# Patient Record
Sex: MALE | Race: WHITE | Employment: FULL TIME | URBAN - METROPOLITAN AREA
[De-identification: names, ages, dates, MRNs, and addresses within clinical notes are randomized per-mention and may not be internally consistent; named-entity substitution may affect disease eponyms.]

---

## 2017-02-20 RX ORDER — LISINOPRIL 10 MG/1
10 TABLET ORAL DAILY
Qty: 30 TABLET | Refills: 0 | Status: SHIPPED | OUTPATIENT
Start: 2017-02-20 | End: 2017-03-18 | Stop reason: SDUPTHER

## 2017-03-30 ENCOUNTER — OFFICE VISIT (OUTPATIENT)
Dept: FAMILY MEDICINE CLINIC | Age: 57
End: 2017-03-30

## 2017-03-30 DIAGNOSIS — D12.6 SERRATED ADENOMA OF COLON: ICD-10-CM

## 2017-03-30 DIAGNOSIS — L40.9 PSORIASIS: ICD-10-CM

## 2017-03-30 DIAGNOSIS — I10 ESSENTIAL HYPERTENSION: Primary | ICD-10-CM

## 2017-03-30 DIAGNOSIS — Z00.00 ROUTINE HEALTH MAINTENANCE: ICD-10-CM

## 2017-03-30 PROCEDURE — 99214 OFFICE O/P EST MOD 30 MIN: CPT | Performed by: NURSE PRACTITIONER

## 2017-03-31 VITALS
BODY MASS INDEX: 24.18 KG/M2 | WEIGHT: 159 LBS | SYSTOLIC BLOOD PRESSURE: 130 MMHG | TEMPERATURE: 98.4 F | HEART RATE: 80 BPM | DIASTOLIC BLOOD PRESSURE: 80 MMHG | RESPIRATION RATE: 16 BRPM

## 2017-03-31 ASSESSMENT — ENCOUNTER SYMPTOMS
SHORTNESS OF BREATH: 0
BLURRED VISION: 0
ORTHOPNEA: 0

## 2017-04-02 PROBLEM — L40.9 PSORIASIS: Status: ACTIVE | Noted: 2017-04-02

## 2017-04-02 RX ORDER — HALOBETASOL PROPIONATE 0.05 %
OINTMENT (GRAM) TOPICAL
Qty: 50 G | Refills: 2 | Status: SHIPPED | OUTPATIENT
Start: 2017-04-02 | End: 2017-10-20 | Stop reason: SDUPTHER

## 2017-04-02 ASSESSMENT — ENCOUNTER SYMPTOMS
NAUSEA: 0
RHINORRHEA: 0
VOMITING: 0
CHEST TIGHTNESS: 0
COUGH: 0
WHEEZING: 0
BACK PAIN: 0
CONSTIPATION: 0
SORE THROAT: 0
EYE PAIN: 0
ABDOMINAL PAIN: 0
ABDOMINAL DISTENTION: 0
PHOTOPHOBIA: 0
DIARRHEA: 0
BLOOD IN STOOL: 0

## 2017-06-12 RX ORDER — LISINOPRIL 10 MG/1
10 TABLET ORAL DAILY
Qty: 30 TABLET | Refills: 3 | Status: SHIPPED | OUTPATIENT
Start: 2017-06-12 | End: 2017-10-16 | Stop reason: SDUPTHER

## 2017-10-16 RX ORDER — LISINOPRIL 10 MG/1
10 TABLET ORAL DAILY
Qty: 30 TABLET | Refills: 3 | Status: SHIPPED | OUTPATIENT
Start: 2017-10-16 | End: 2017-11-20 | Stop reason: SDUPTHER

## 2017-10-16 NOTE — TELEPHONE ENCOUNTER
LOV 3/30/17  LRF 6/12/17    Health Maintenance   Topic Date Due    Hepatitis C screen  1960    HIV screen  11/10/1976    DTaP/Tdap/Td vaccine (1 - Tdap) 11/10/1980    Colon cancer screen colonoscopy  02/15/2017    Flu vaccine (1) 09/01/2017    Lipid screen  05/23/2019             (applicable per patient's age: Cancer Screenings, Depression Screening, Fall Risk Screening, Immunizations)    LDL Calculated (mg/dL)   Date Value   05/23/2014 131     AST (U/L)   Date Value   05/23/2014 17     ALT (U/L)   Date Value   05/23/2014 17     BUN (mg/dL)   Date Value   05/23/2014 13      (goal A1C is < 7)   (goal LDL is <100) need 30-50% reduction from baseline     BP Readings from Last 3 Encounters:   03/31/17 130/80   11/19/15 140/82   09/30/13 122/88    (goal /80)      All Future Testing planned in [facility]:  Lab Frequency Next Occurrence   Lipid Panel Once 04/10/2017   Comprehensive Metabolic Panel Once 64/03/7452   CBC Once 04/10/2017       Next Visit Date:  No future appointments.          Patient Active Problem List:     HTN (hypertension)     Serrated adenoma of colon     Psoriasis

## 2017-10-20 RX ORDER — HALOBETASOL PROPIONATE 0.05 %
OINTMENT (GRAM) TOPICAL
Qty: 50 G | Refills: 1 | Status: SHIPPED | OUTPATIENT
Start: 2017-10-20 | End: 2018-03-29 | Stop reason: SDUPTHER

## 2017-11-20 RX ORDER — LISINOPRIL 10 MG/1
10 TABLET ORAL DAILY
Qty: 90 TABLET | Refills: 0 | Status: SHIPPED | OUTPATIENT
Start: 2017-11-20 | End: 2018-02-28 | Stop reason: SDUPTHER

## 2017-12-27 ENCOUNTER — HOSPITAL ENCOUNTER (OUTPATIENT)
Age: 57
Setting detail: SPECIMEN
Discharge: HOME | End: 2017-12-27
Payer: COMMERCIAL

## 2017-12-27 DIAGNOSIS — Z00.00 ROUTINE HEALTH MAINTENANCE: ICD-10-CM

## 2017-12-27 DIAGNOSIS — I10 ESSENTIAL HYPERTENSION: ICD-10-CM

## 2017-12-27 LAB
ALBUMIN SERPL-MCNC: 4.3 G/DL (ref 3.5–5.2)
ALBUMIN/GLOBULIN RATIO: 1.7 (ref 1–2.5)
ALP BLD-CCNC: 89 U/L (ref 40–129)
ALT SERPL-CCNC: 20 U/L (ref 5–41)
ANION GAP SERPL CALCULATED.3IONS-SCNC: 12 MMOL/L (ref 9–17)
AST SERPL-CCNC: 18 U/L
BILIRUB SERPL-MCNC: 0.73 MG/DL (ref 0.3–1.2)
BUN BLDV-MCNC: 16 MG/DL (ref 6–20)
BUN/CREAT BLD: ABNORMAL (ref 9–20)
CALCIUM SERPL-MCNC: 9.1 MG/DL (ref 8.6–10.4)
CHLORIDE BLD-SCNC: 97 MMOL/L (ref 98–107)
CHOLESTEROL/HDL RATIO: 2.1
CHOLESTEROL: 259 MG/DL
CO2: 28 MMOL/L (ref 20–31)
CREAT SERPL-MCNC: 0.8 MG/DL (ref 0.7–1.2)
GFR AFRICAN AMERICAN: >60 ML/MIN
GFR NON-AFRICAN AMERICAN: >60 ML/MIN
GFR SERPL CREATININE-BSD FRML MDRD: ABNORMAL ML/MIN/{1.73_M2}
GFR SERPL CREATININE-BSD FRML MDRD: ABNORMAL ML/MIN/{1.73_M2}
GLUCOSE BLD-MCNC: 95 MG/DL (ref 70–99)
HCT VFR BLD CALC: 46.3 % (ref 40.7–50.3)
HDLC SERPL-MCNC: 123 MG/DL
HEMOGLOBIN: 15.1 G/DL (ref 13–17)
LDL CHOLESTEROL: 117 MG/DL (ref 0–130)
MCH RBC QN AUTO: 30.4 PG (ref 25.2–33.5)
MCHC RBC AUTO-ENTMCNC: 32.6 G/DL (ref 28.4–34.8)
MCV RBC AUTO: 93.3 FL (ref 82.6–102.9)
PDW BLD-RTO: 12.5 % (ref 11.8–14.4)
PLATELET # BLD: 329 K/UL (ref 138–453)
PMV BLD AUTO: 10.4 FL (ref 8.1–13.5)
POTASSIUM SERPL-SCNC: 4.5 MMOL/L (ref 3.7–5.3)
RBC # BLD: 4.96 M/UL (ref 4.21–5.77)
SODIUM BLD-SCNC: 137 MMOL/L (ref 135–144)
TOTAL PROTEIN: 6.9 G/DL (ref 6.4–8.3)
TRIGL SERPL-MCNC: 95 MG/DL
VLDLC SERPL CALC-MCNC: ABNORMAL MG/DL (ref 1–30)
WBC # BLD: 4.5 K/UL (ref 3.5–11.3)

## 2018-02-13 ENCOUNTER — OFFICE VISIT (OUTPATIENT)
Dept: DERMATOLOGY | Age: 58
End: 2018-02-13
Payer: COMMERCIAL

## 2018-02-13 VITALS
OXYGEN SATURATION: 96 % | DIASTOLIC BLOOD PRESSURE: 84 MMHG | HEIGHT: 68 IN | WEIGHT: 166 LBS | SYSTOLIC BLOOD PRESSURE: 146 MMHG | BODY MASS INDEX: 25.16 KG/M2 | HEART RATE: 82 BPM

## 2018-02-13 DIAGNOSIS — L57.0 ACTINIC KERATOSIS: Primary | ICD-10-CM

## 2018-02-13 PROCEDURE — 99203 OFFICE O/P NEW LOW 30 MIN: CPT | Performed by: DERMATOLOGY

## 2018-02-13 RX ORDER — FLUOROURACIL 50 MG/G
CREAM TOPICAL
Qty: 80 G | Refills: 1 | Status: SHIPPED | OUTPATIENT
Start: 2018-02-13 | End: 2018-08-01 | Stop reason: ALTCHOICE

## 2018-02-13 NOTE — PROGRESS NOTES
thin layer of plain white petrolatum (Vaseline) or hydrocortisone 1% ointment (over the counter). You can stop using the petrolatum/hydrocortisone once the areas have healed. It takes 2-4 weeks for healthy new skin to replace the sun-damaged skin. The new skin can be slightly pink at first, but this usually resolves within a few weeks. To help with the healing process, you can use hydrocortisone 1% ointment twice a day. If you feel that you need something stronger, you can call the office for prescription strength. When should I call the office? If you notice any signs of infection such as excessive swelling, draining or oozing, or increased pain please contact the office. If you have any concerns about your treatment, or if you are experiencing difficulty with your recovery, please contact the office. Photo surveillance performed: No    Follow-up: follow up in 4 months    This note was created with the assistance of a speech-recognition program.  Although the intention is to generate a document that actually reflects the content of the visit, no guarantees can be provided that every mistake has been identified and corrected by editing.     Electronically signed by Hanna Uribe MD on 2/13/18 at 3:59 PM

## 2018-02-14 RX ORDER — FLUOROURACIL 50 MG/G
CREAM TOPICAL
Qty: 80 G | Refills: 1 | Status: CANCELLED | OUTPATIENT
Start: 2018-02-14

## 2018-02-14 NOTE — TELEPHONE ENCOUNTER
Pt called and requested fluorouracil 5% cream to be sent [facility] [address] [facility]

## 2018-02-14 NOTE — TELEPHONE ENCOUNTER
Cancelled at [place] and called into [facility] as requested with [facility]

## 2018-03-01 RX ORDER — LISINOPRIL 10 MG/1
10 TABLET ORAL DAILY
Qty: 90 TABLET | Refills: 1 | Status: SHIPPED | OUTPATIENT
Start: 2018-03-01 | End: 2018-08-28 | Stop reason: SDUPTHER

## 2018-03-29 ENCOUNTER — OFFICE VISIT (OUTPATIENT)
Dept: FAMILY MEDICINE CLINIC | Age: 58
End: 2018-03-29
Payer: COMMERCIAL

## 2018-03-29 VITALS
DIASTOLIC BLOOD PRESSURE: 86 MMHG | BODY MASS INDEX: 24.86 KG/M2 | HEART RATE: 80 BPM | HEIGHT: 68 IN | TEMPERATURE: 98.4 F | WEIGHT: 164 LBS | SYSTOLIC BLOOD PRESSURE: 122 MMHG

## 2018-03-29 DIAGNOSIS — L40.9 PSORIASIS: ICD-10-CM

## 2018-03-29 DIAGNOSIS — D12.6 SERRATED ADENOMA OF COLON: ICD-10-CM

## 2018-03-29 DIAGNOSIS — Z11.4 SCREENING FOR HIV (HUMAN IMMUNODEFICIENCY VIRUS): ICD-10-CM

## 2018-03-29 DIAGNOSIS — Z11.59 NEED FOR HEPATITIS C SCREENING TEST: ICD-10-CM

## 2018-03-29 DIAGNOSIS — I10 ESSENTIAL HYPERTENSION: Primary | ICD-10-CM

## 2018-03-29 PROCEDURE — 99214 OFFICE O/P EST MOD 30 MIN: CPT | Performed by: NURSE PRACTITIONER

## 2018-03-29 ASSESSMENT — PATIENT HEALTH QUESTIONNAIRE - PHQ9
SUM OF ALL RESPONSES TO PHQ QUESTIONS 1-9: 0
SUM OF ALL RESPONSES TO PHQ9 QUESTIONS 1 & 2: 0
2. FEELING DOWN, DEPRESSED OR HOPELESS: 0
1. LITTLE INTEREST OR PLEASURE IN DOING THINGS: 0

## 2018-03-29 ASSESSMENT — ENCOUNTER SYMPTOMS
SHORTNESS OF BREATH: 0
BLURRED VISION: 0

## 2018-03-30 RX ORDER — HALOBETASOL PROPIONATE 0.05 %
OINTMENT (GRAM) TOPICAL
Qty: 50 G | Refills: 1 | Status: SHIPPED | OUTPATIENT
Start: 2018-03-30 | End: 2019-10-04 | Stop reason: SDUPTHER

## 2018-04-06 ASSESSMENT — ENCOUNTER SYMPTOMS
COUGH: 0
DIARRHEA: 0
BLOOD IN STOOL: 0
EYE PAIN: 0
NAUSEA: 0
SORE THROAT: 0
ABDOMINAL DISTENTION: 0
CHEST TIGHTNESS: 0
ABDOMINAL PAIN: 0
BACK PAIN: 0
PHOTOPHOBIA: 0
WHEEZING: 0
RHINORRHEA: 0
VOMITING: 0
CONSTIPATION: 0

## 2018-07-31 ENCOUNTER — OFFICE VISIT (OUTPATIENT)
Dept: DERMATOLOGY | Age: 58
End: 2018-07-31
Payer: COMMERCIAL

## 2018-07-31 VITALS
SYSTOLIC BLOOD PRESSURE: 118 MMHG | BODY MASS INDEX: 24.71 KG/M2 | WEIGHT: 163 LBS | OXYGEN SATURATION: 97 % | HEIGHT: 68 IN | DIASTOLIC BLOOD PRESSURE: 77 MMHG | HEART RATE: 70 BPM

## 2018-07-31 DIAGNOSIS — L57.0 ACTINIC KERATOSIS: Primary | ICD-10-CM

## 2018-07-31 DIAGNOSIS — L57.8 ACTINIC SKIN DAMAGE: ICD-10-CM

## 2018-07-31 DIAGNOSIS — R21 RASH: ICD-10-CM

## 2018-07-31 PROCEDURE — 99214 OFFICE O/P EST MOD 30 MIN: CPT | Performed by: DERMATOLOGY

## 2018-08-01 NOTE — PROGRESS NOTES
Dermatology Patient Note  [facility] DERMATOLOGY  [address] [facility] [address] [facility] [address]  [address] [place] [address]  Dept: [phone]  Dept Fax: [phone]      VISIT DATE: 7/31/2018   REFERRING PROVIDER: No ref. provider found      Ninfa Worley is a 65 y.o. male  who presents today in the office for:    Follow-up (4 month follow up for Actinic Keratosis)      HISTORY OF PRESENT ILLNESS:  HPI AK/NMSC    Ulises Ojeda was seen today for f/u evaluation of actinic keratosis and focal itching    Course: Used efudex and rough areas on neck and back have largely resolved, itching well controlled with once weekly halobetasol     Areas of Involvement: Sun exposed skin surfaces    Associated Symptoms:Itching    Exacerbating Factors:Hx of Prolonged Sun Exposure    Previous Skin Cancers: actinic keratosis        CURRENT MEDICATIONS:   Current Outpatient Prescriptions   Medication Sig Dispense Refill    halobetasol (ULTRAVATE) 0.05 % ointment APPLY TOPICALLY TO AFFECTED AREA(S) ONCE TO TWICE DAILY AS NEEDED 50 g 1    lisinopril (PRINIVIL;ZESTRIL) 10 MG tablet Take 1 tablet by mouth daily 90 tablet 1     No current facility-administered medications for this visit. ALLERGIES:   No Known Allergies    SOCIAL HISTORY:  Social History   Substance Use Topics    Smoking status: Never Smoker    Smokeless tobacco: Former User    Alcohol use No       REVIEW OF SYSTEMS:  Review of Systems   Constitutional: Negative.       Skin: Denies any new changing, growing or bleeding lesions or rashes except as described in the HPI     PHYSICAL EXAM:   /77 (Site: Left Arm, Position: Sitting, Cuff Size: Medium Adult)   Pulse 70   Ht 5' 8\" (1.727 m)   Wt 163 lb (73.9 kg)   SpO2 97%   BMI 24.78 kg/m²     General Exam:  General Appearance: No acute distress, Well nourished     Neuro: Alert and oriented to person, place and time  Psych: Normal affect   Lymph Node: Not performed    Cutaneous Exam: Performed as documented in clinic note below. Waist-up skin, which includes the head/face, neck, both arms, chest, back, abdomen, digits and/or nails, was examined. Pertinent Physical Exam Findings:  Physical Exam   Skin:   Actinic damage of the face, neck, trunk and upper extremities    No AK on exam    Focal ill-defined erythema and excoriation on the neck and arms       Medical Necessity of Exam Performed:   Distribution of patient concerns    Additional Diagnostic Testing performed during exam: Not performed ,  Not performed    ASSESSMENT:   Diagnosis Orders   1. Actinic keratosis     2. Actinic skin damage     3. Rash         Plan of Action is as Follows:  Assessment 1. Actinic keratosis  Resolved    2. Actinic skin damage  Discussed sunscreen and sun protection - recommend SPF 30 or greater sunscreen applied every 2-3 hours, sun protective clothing and avoidance of peak sun. 3. Rash  Favor asteatotic eczema - continue halobetasol once weekly, start using an emollient          Patient Instructions   1. Continue applying halobetasol to active areas of rash as needed. If frequency becomes more often, everyday, please call to inform Doctor. 2. Follow up in the office in 1 year    Sun Protection     There are two types of sun rays that are harmful to the skin. UVA rays cause skin aging and skin cancer, such as melanoma. UVB rays cause sunburns, cataracts, and also contribute to skin cancer. The American-Academy of Dermatology recommends that all kids wear a broad spectrum, waterproof sunscreen with a Sun Protection Factor (SPF) of 30 or higher. It is important to check the ingredient label to be sure the sunscreen will protect the skin from both UVA and UVB sunrays. Your sunscreen should contain at least one of the following ingredients: titanium dioxide, zinc oxide, or avobenzone. Sunscreen will not be effective unless it is applied to all exposed skin. Sunscreens work best if they are applied 30 minutes before sun exposure. They should be reapplied every 2 hours and after any water exposure. Sunscreen is not perfect. It is important to use other methods to protect the skin from sun exposure also. Wear hats, sunglasses and other sun protective clothing when outdoors. Stay in the shade during the peak hours of sun exposure between 10 AM and 4 PM.          Photo surveillance performed: No    Follow-up: 1 year    This note was created with the assistance of a speech-recognition program.  Although the intention is to generate a document that actually reflects the content of the visit, no guarantees can be provided that every mistake has been identified and corrected by editing.     Electronically signed by Abigail Tejeda MD on 8/1/18 at 7:49 AM

## 2018-08-28 RX ORDER — LISINOPRIL 10 MG/1
10 TABLET ORAL DAILY
Qty: 90 TABLET | Refills: 1 | Status: SHIPPED | OUTPATIENT
Start: 2018-08-28 | End: 2018-12-09 | Stop reason: SDUPTHER

## 2018-12-10 RX ORDER — LISINOPRIL 10 MG/1
10 TABLET ORAL DAILY
Qty: 90 TABLET | Refills: 1 | Status: SHIPPED | OUTPATIENT
Start: 2018-12-10 | End: 2019-06-10 | Stop reason: SDUPTHER

## 2019-07-11 DIAGNOSIS — I10 ESSENTIAL HYPERTENSION: ICD-10-CM

## 2019-07-12 RX ORDER — LISINOPRIL 10 MG/1
10 TABLET ORAL DAILY
Qty: 30 TABLET | Refills: 0 | Status: SHIPPED | OUTPATIENT
Start: 2019-07-12 | End: 2019-08-08 | Stop reason: SDUPTHER

## 2019-09-17 DIAGNOSIS — I10 ESSENTIAL HYPERTENSION: ICD-10-CM

## 2019-09-17 RX ORDER — LISINOPRIL 10 MG/1
10 TABLET ORAL DAILY
Qty: 30 TABLET | Refills: 5 | OUTPATIENT
Start: 2019-09-17 | End: 2020-09-16

## 2019-09-20 RX ORDER — LISINOPRIL 10 MG/1
10 TABLET ORAL DAILY
Qty: 30 TABLET | Refills: 0 | Status: SHIPPED | OUTPATIENT
Start: 2019-09-20 | End: 2019-10-04 | Stop reason: SDUPTHER

## 2019-10-04 ENCOUNTER — OFFICE VISIT (OUTPATIENT)
Dept: FAMILY MEDICINE CLINIC | Age: 59
End: 2019-10-04
Payer: COMMERCIAL

## 2019-10-04 VITALS
TEMPERATURE: 97.4 F | WEIGHT: 166 LBS | SYSTOLIC BLOOD PRESSURE: 122 MMHG | HEART RATE: 84 BPM | RESPIRATION RATE: 18 BRPM | BODY MASS INDEX: 25.16 KG/M2 | DIASTOLIC BLOOD PRESSURE: 82 MMHG | HEIGHT: 68 IN

## 2019-10-04 DIAGNOSIS — L40.9 PSORIASIS: ICD-10-CM

## 2019-10-04 DIAGNOSIS — Z12.11 SCREEN FOR COLON CANCER: ICD-10-CM

## 2019-10-04 DIAGNOSIS — Z28.21 INFLUENZA VACCINE REFUSED: ICD-10-CM

## 2019-10-04 DIAGNOSIS — Z11.4 SCREENING FOR HIV (HUMAN IMMUNODEFICIENCY VIRUS): ICD-10-CM

## 2019-10-04 DIAGNOSIS — Z11.59 NEED FOR HEPATITIS C SCREENING TEST: ICD-10-CM

## 2019-10-04 DIAGNOSIS — I10 ESSENTIAL HYPERTENSION: Primary | ICD-10-CM

## 2019-10-04 PROCEDURE — 99214 OFFICE O/P EST MOD 30 MIN: CPT | Performed by: NURSE PRACTITIONER

## 2019-10-04 RX ORDER — LISINOPRIL 10 MG/1
10 TABLET ORAL DAILY
Qty: 30 TABLET | Refills: 5 | Status: SHIPPED | OUTPATIENT
Start: 2019-10-04 | End: 2020-04-14 | Stop reason: SDUPTHER

## 2019-10-04 RX ORDER — HALOBETASOL PROPIONATE 0.05 %
OINTMENT (GRAM) TOPICAL
Qty: 50 G | Refills: 1 | Status: SHIPPED | OUTPATIENT
Start: 2019-10-04 | End: 2020-06-09 | Stop reason: SDUPTHER

## 2019-10-04 ASSESSMENT — ENCOUNTER SYMPTOMS
NAUSEA: 0
ABDOMINAL DISTENTION: 0
ABDOMINAL PAIN: 0
BACK PAIN: 0
RHINORRHEA: 0
COUGH: 0
BLOOD IN STOOL: 0
PHOTOPHOBIA: 0
EYE PAIN: 0
WHEEZING: 0
SHORTNESS OF BREATH: 0
SORE THROAT: 0
VOMITING: 0
DIARRHEA: 0
CHEST TIGHTNESS: 0
CONSTIPATION: 0

## 2019-10-04 ASSESSMENT — PATIENT HEALTH QUESTIONNAIRE - PHQ9
2. FEELING DOWN, DEPRESSED OR HOPELESS: 0
SUM OF ALL RESPONSES TO PHQ QUESTIONS 1-9: 0
SUM OF ALL RESPONSES TO PHQ9 QUESTIONS 1 & 2: 0
1. LITTLE INTEREST OR PLEASURE IN DOING THINGS: 0
SUM OF ALL RESPONSES TO PHQ QUESTIONS 1-9: 0

## 2019-11-13 ENCOUNTER — OFFICE VISIT (OUTPATIENT)
Dept: DERMATOLOGY | Age: 59
End: 2019-11-13
Payer: COMMERCIAL

## 2019-11-13 VITALS
DIASTOLIC BLOOD PRESSURE: 89 MMHG | WEIGHT: 168 LBS | HEART RATE: 83 BPM | OXYGEN SATURATION: 94 % | HEIGHT: 68 IN | SYSTOLIC BLOOD PRESSURE: 133 MMHG | BODY MASS INDEX: 25.46 KG/M2

## 2019-11-13 DIAGNOSIS — L64.9 ANDROGENETIC ALOPECIA: Primary | ICD-10-CM

## 2019-11-13 DIAGNOSIS — R21 RASH: ICD-10-CM

## 2019-11-13 DIAGNOSIS — D48.5 NEOPLASM OF UNCERTAIN BEHAVIOR OF SKIN: ICD-10-CM

## 2019-11-13 DIAGNOSIS — L57.8 ACTINIC SKIN DAMAGE: ICD-10-CM

## 2019-11-13 PROCEDURE — 99214 OFFICE O/P EST MOD 30 MIN: CPT | Performed by: DERMATOLOGY

## 2019-11-13 PROCEDURE — 11102 TANGNTL BX SKIN SINGLE LES: CPT | Performed by: DERMATOLOGY

## 2019-11-13 PROCEDURE — 11103 TANGNTL BX SKIN EA SEP/ADDL: CPT | Performed by: DERMATOLOGY

## 2019-11-14 ENCOUNTER — HOSPITAL ENCOUNTER (OUTPATIENT)
Age: 59
Setting detail: SPECIMEN
Discharge: HOME | End: 2019-11-14
Payer: COMMERCIAL

## 2019-11-14 RX ORDER — LIDOCAINE HYDROCHLORIDE AND EPINEPHRINE 10; 10 MG/ML; UG/ML
2 INJECTION, SOLUTION INFILTRATION; PERINEURAL ONCE
Status: SHIPPED | OUTPATIENT
Start: 2019-11-14

## 2019-11-20 LAB — DERMATOLOGY PATHOLOGY REPORT: NORMAL

## 2019-12-13 ENCOUNTER — HOSPITAL ENCOUNTER (EMERGENCY)
Facility: CLINIC | Age: 59
Discharge: HOME | End: 2019-12-13
Attending: EMERGENCY MEDICINE
Payer: COMMERCIAL

## 2019-12-13 VITALS
HEIGHT: 68 IN | DIASTOLIC BLOOD PRESSURE: 84 MMHG | RESPIRATION RATE: 16 BRPM | OXYGEN SATURATION: 95 % | TEMPERATURE: 98.6 F | SYSTOLIC BLOOD PRESSURE: 153 MMHG | HEART RATE: 88 BPM | BODY MASS INDEX: 24.25 KG/M2 | WEIGHT: 160 LBS

## 2019-12-13 DIAGNOSIS — J01.10 ACUTE FRONTAL SINUSITIS, RECURRENCE NOT SPECIFIED: Primary | ICD-10-CM

## 2019-12-13 PROCEDURE — 6360000002 HC RX W HCPCS: Performed by: EMERGENCY MEDICINE

## 2019-12-13 PROCEDURE — 99282 EMERGENCY DEPT VISIT SF MDM: CPT

## 2019-12-13 RX ORDER — DEXAMETHASONE SODIUM PHOSPHATE 10 MG/ML
10 INJECTION INTRAMUSCULAR; INTRAVENOUS ONCE
Status: COMPLETED | OUTPATIENT
Start: 2019-12-13 | End: 2019-12-13

## 2019-12-13 RX ORDER — PREDNISONE 50 MG/1
50 TABLET ORAL DAILY
Qty: 4 TABLET | Refills: 0 | Status: SHIPPED | OUTPATIENT
Start: 2019-12-13 | End: 2019-12-17

## 2019-12-13 RX ORDER — AMOXICILLIN AND CLAVULANATE POTASSIUM 875; 125 MG/1; MG/1
1 TABLET, FILM COATED ORAL 2 TIMES DAILY
Qty: 14 TABLET | Refills: 0 | Status: SHIPPED | OUTPATIENT
Start: 2019-12-13 | End: 2019-12-20

## 2019-12-13 RX ADMIN — DEXAMETHASONE SODIUM PHOSPHATE 10 MG: 10 INJECTION INTRAMUSCULAR; INTRAVENOUS at 17:10

## 2019-12-13 ASSESSMENT — ENCOUNTER SYMPTOMS
ABDOMINAL PAIN: 0
SINUS PRESSURE: 1
VOMITING: 0
COUGH: 1

## 2019-12-16 ENCOUNTER — TELEPHONE (OUTPATIENT)
Dept: FAMILY MEDICINE CLINIC | Age: 59
End: 2019-12-16

## 2019-12-16 DIAGNOSIS — Z12.11 SCREEN FOR COLON CANCER: Primary | ICD-10-CM

## 2019-12-17 ENCOUNTER — TELEPHONE (OUTPATIENT)
Dept: GASTROENTEROLOGY | Age: 59
End: 2019-12-17

## 2020-02-21 NOTE — LETTER
[facility]) Dermatology   [address] [place] [address]  [address]  [address] [address]  Phone: [phone]  Fax: [phone]     Casey Ley MD       February 13, 2018     No referring provider defined for this encounter. Patient: Lige Hamman   MR Number: [id number]   YOB: 1960   Date of Visit: 2/13/2018     Dear Dr. Bola Centeno ref. provider found: Thank you for the request for consultation for Mr. Sousa Ast to me for evaluation. Below are the relevant portions of my assessment and plan of care. Actinic Keratosis - numerous on the chest, upper back and arms  - Efudex BID x 4 weeks to these areas, counseling provided  - Sunscreen    Psoriasis - no evidence of active psoriasis on exam today - patient has been using halobetasol on his actinic keratosis - advised to discontinue this until completion of efudex and then to only use in on areas of psoriasis. Patient Instructions       Topical Chemotherapy    What is topical chemotherapy? Topical chemotherapy is a cream that targets sun-damaged and pre-cancerous cells and destroys them. Name of the prescription:_Efudex (Flurouracil)___________    How should it be used? Apply a small amount to affected area(s): upper chest, upper chest and arms from elbows down to wrists_____ twice daily    for _____2-4 ________ weeks stopping when the area becomes red and irritated. Avoid contact with the eyes. Wash your hands after application. It may be less irritating to use the cream during the winter to avoid sweating and irritation that can occur in the warmer months. Make sure you protect the affected areas from the sun during treatment; staying indoors and wearing sun protective clothing are strongly recommended. What should I expect from treatment? Treatment often results in a stinging or burning sensation. After about 3-7 days, the sun-damaged parts of the skin become red and irritated.
Stable

## 2020-04-15 RX ORDER — LISINOPRIL 10 MG/1
10 TABLET ORAL DAILY
Qty: 30 TABLET | Refills: 5 | Status: SHIPPED | OUTPATIENT
Start: 2020-04-15 | End: 2020-10-12

## 2020-06-09 RX ORDER — HALOBETASOL PROPIONATE 0.05 %
OINTMENT (GRAM) TOPICAL
Qty: 50 G | Refills: 1 | Status: SHIPPED | OUTPATIENT
Start: 2020-06-09 | End: 2021-01-04 | Stop reason: SDUPTHER

## 2020-07-29 ENCOUNTER — HOSPITAL ENCOUNTER (OUTPATIENT)
Age: 60
Setting detail: SPECIMEN
Discharge: HOME | End: 2020-07-29
Payer: COMMERCIAL

## 2020-07-29 ENCOUNTER — OFFICE VISIT (OUTPATIENT)
Dept: DERMATOLOGY | Age: 60
End: 2020-07-29
Payer: COMMERCIAL

## 2020-07-29 VITALS
HEIGHT: 68 IN | WEIGHT: 168.4 LBS | OXYGEN SATURATION: 94 % | DIASTOLIC BLOOD PRESSURE: 94 MMHG | SYSTOLIC BLOOD PRESSURE: 149 MMHG | BODY MASS INDEX: 25.52 KG/M2 | TEMPERATURE: 97.6 F | HEART RATE: 89 BPM

## 2020-07-29 PROCEDURE — 99214 OFFICE O/P EST MOD 30 MIN: CPT | Performed by: DERMATOLOGY

## 2020-07-29 PROCEDURE — 11102 TANGNTL BX SKIN SINGLE LES: CPT | Performed by: DERMATOLOGY

## 2020-07-29 PROCEDURE — 3017F COLORECTAL CA SCREEN DOC REV: CPT | Performed by: DERMATOLOGY

## 2020-07-29 PROCEDURE — 1036F TOBACCO NON-USER: CPT | Performed by: DERMATOLOGY

## 2020-07-29 PROCEDURE — G8419 CALC BMI OUT NRM PARAM NOF/U: HCPCS | Performed by: DERMATOLOGY

## 2020-07-29 PROCEDURE — 11103 TANGNTL BX SKIN EA SEP/ADDL: CPT | Performed by: DERMATOLOGY

## 2020-07-29 PROCEDURE — G8427 DOCREV CUR MEDS BY ELIG CLIN: HCPCS | Performed by: DERMATOLOGY

## 2020-07-29 RX ORDER — LIDOCAINE HYDROCHLORIDE AND EPINEPHRINE 10; 10 MG/ML; UG/ML
0.5 INJECTION, SOLUTION INFILTRATION; PERINEURAL ONCE
Status: COMPLETED | OUTPATIENT
Start: 2020-07-29 | End: 2020-07-29

## 2020-07-29 RX ORDER — TACROLIMUS 1 MG/G
OINTMENT TOPICAL
Qty: 60 G | Refills: 2 | Status: SHIPPED | OUTPATIENT
Start: 2020-07-29 | End: 2021-01-04

## 2020-07-29 RX ADMIN — LIDOCAINE HYDROCHLORIDE AND EPINEPHRINE 0.5 ML: 10; 10 INJECTION, SOLUTION INFILTRATION; PERINEURAL at 16:17

## 2020-07-29 NOTE — PATIENT INSTRUCTIONS
1. Apply protopic to active areas of rash twice daily  2. Await prior auth for dupixent    BIOPSY WOUND CARE    A biopsy is where a small piece of skin tissue is removed and examined by a pathologist.  When a biopsy is done, there is a small wound site that requires proper care to prevent infection and scarring. Some biopsies require sutures and their removal.    How to Care for Biopsy Wound    A.  Leave band-aid or dressing on for 24 hours. B. Wash two times a day with soap and water. C.  Let the wound air dry, then apply Vaseline ointment and cover with a Band-Aid       unless otherwise instructed by your provider. D. If there is slight discomfort, you may give acetaminophen or ibuprofen. Bleeding:  Every effort is made to stop bleeding prior to you leaving the dermatology clinic. Unfortunately, people will occasionally start to bleed after leaving the clinic. If you start to bleed hold firm pressure for 15 minutes to the area. If you are on blood thinners I recommend keeping a product called wound seal (can buy at any drug store) at home as this can be a useful adjunct to stop bleeding. If after holding pressure for 15 minutes and/or applying wound seal the bleeding does not stop please call the dermatology clinic. When To Call the Doctor    Call the Dermatology Clinic or your doctor if any of the following occur:    A. Redness and swelling  B. Tenderness and warm to touch  C.  Drainage from wound  D. Fever    Biopsy Results    Biopsy results are usually available in 1-2 weeks. We provide biopsy results in letters for begin results or we will call for any concerning results. If you have not heard from our staff please call the office within 2 weeks. Please call our office with any concerns at [phone]. Patient Information  DUPIXENT® (DU-pix-ent) (dupilumab) Injection,  for subcutaneous use    What is DUPIXENT?   ? Marco Fujita is a prescription medicine used to treat adults with moderate-to-severe atopic dermatitis (eczema) that is not well controlled  with prescription therapies used on the skin (topical), or who cannot use topical therapies. ? Kyleview can be used with or without topical corticosteroids. ? It is not known if DUPIXENT is safe and effective in children. Do not use DUPIXENT if you are allergic to dupilumab or to any of the ingredients in Kyleview. See the end of this leaflet for a complete  list of ingredients in Kyleview. Before using Kyleview, tell your healthcare provider about all your medical conditions, including if you:  ? have eye problems  ? have a parasitic (helminth) infection  ? have asthma  ? are scheduled to receive any vaccinations. You should not receive a ?live vaccine? if you are treated with Kyleview. ? are pregnant or plan to become pregnant. It is not known whether Kyleview will harm your unborn baby. ? are breastfeeding or plan to breastfeed. It is not known whether Kyleview passes into your breast milk. Tell your healthcare provider about all of the medicines you take, including prescription and over-the-counter medicines, vitamins, and herbal  supplements. If you have asthma and are taking asthma medicines, do not change or stop your asthma medicine without talking to your  healthcare provider. How should I use DUPIXENT? ? See the detailed ? Instructions for Use? that comes with Kyleview for information on how to prepare and inject DUPIXENT and  how to properly store and throw away (dispose of) used DUPIXENT pre-filled syringes. ? Use DUPIXENT exactly as prescribed by your healthcare provider. ? Kyleview comes as a single-dose pre-filled syringe with needle shield. ? Kyleview is given as an injection under the skin (subcutaneous injection). ? If your healthcare provider decides that you or a caregiver can give the injections of Kyleview, you or your caregiver should receive  training on the right way to prepare and inject Kyleview.  Do not try to inject Kyleview until you have been shown the right way by  your healthcare provider. ? If you miss a dose of DUPIXENT, give the injection within 7 days from the missed dose, then continue with the original schedule. If the  missed dose is not given within 7 days, wait until the next scheduled dose to give your DUPIXENT injection. ? If you inject more DUPIXENT than prescribed, call your healthcare provider right away. ? Your healthcare provider may prescribe other topical medicines to use with Kyleview. Use the other prescribed topical medicines  exactly as your healthcare provider tells you to. What are the possible side effects of DUPIXENT? DUPIXENT can cause serious side effects, including:  ? Allergic reactions. Stop using Kyleview and go to the nearest hospital emergency room if you get any of the following symptoms:   fever   general ill feeling   swollen lymph nodes   hives   itching   joint pain   skin rash  ? Eye problems. Tell your healthcare provider if you have any new or worsening eye problems, including eye pain or changes in vision. The most common side effects of DUPIXENT include:  ? injection site reactions  ? eye and eyelid inflammation, including redness, swelling and itching  ? cold sores in your mouth or on your lips  Tell your healthcare provider if you have any side effect that bothers you or that does not go away. These are not all of the possible side effects of DUPIXENT. Call your doctor for medical advice about side effects. You may report side effects to FDA at 6-368-FDA-8434. General information about the safe and effective use of DUPIXENT. Medicines are sometimes prescribed for purposes other than those listed in a Patient Information leaflet. Do not use DUPIXENT for a  condition for which it was not prescribed. Do not give DUPIXENT to other people, even if they have the same symptoms that you have. It  may harm them.  You can ask your pharmacist or healthcare provider for information about Kyleview that is written for health professionals. What are the ingredients in Kyleview? Active ingredient: dupilumab  Inactive ingredients: L-arginine hydrochloride, L-histidine, polysorbate 80, sodium acetate, sucrose, and water for injection. Manufactured by: [address] [place] [address].[facility] [address]. License No. [id number]  Marketed by: Nina Daley, [address] [place] [address] and [address] [place] [address], [address]  [address] is a registered trademark of [address] / © 2017 [address] [place] [address]. / Nina Kaminski. All  rights reserved. For more information about DUPIXENT, go to www. [employer] or call 8- 081DUPIXENT (6-275.169.5507). This Patient Information has been approved by the U.S. Food and Drug Administration.  Issued: March 2017  [id number]

## 2020-07-29 NOTE — PROGRESS NOTES
Dermatology Patient Note  [facility] [address]  [address]  [address] [facility] [address]  Dept: [phone]  Dept Fax: [phone]      VISIT DATE: 7/29/2020   REFERRING PROVIDER: No ref. provider found      Cozette Hodgkins is a 63 y.o. male  who presents today in the office for:    Other (FBSC-Pt is itchy all over the place. He states that the itching is worse after cutting grass. He has moles that he would like looked at. )      HISTORY OF PRESENT ILLNESS:  HPI AK/NMSC    Marimar Salas was seen today for follow-up evaluation of new lesions    Duration of Lesion/Lesions:several months    Course: growing     Areas of Involvement: chest, left leg    Associated Symptoms:None    Exacerbating Factors:Hx of Prolonged Sun Exposure    HPI Rash Followup:    Marimar Salas was seen today for follow-up evaluation of eczema    Interim Course:  Worsening    Areas of Involvement: Generalized    Associated Symptoms: Itching    Exacerbating Factors: grass    Current Medications for this Rash:  halobetasol     Rash Treatment Compliance:  Using all Topical Medications as Prescribed at Last Visit    Side Effects from Treatments: None    Interim  Evaluation: None                  CURRENT MEDICATIONS:   Current Outpatient Medications   Medication Sig Dispense Refill    tacrolimus (PROTOPIC) 0.1 % ointment Apply to rash twice daily 60 g 2    halobetasol (ULTRAVATE) 0.05 % ointment APPLY TOPICALLY TO AFFECTED AREA(S) ONCE TO TWICE DAILY AS NEEDED 50 g 1    lisinopril (PRINIVIL;ZESTRIL) 10 MG tablet Take 1 tablet by mouth daily 30 tablet 5     Current Facility-Administered Medications   Medication Dose Route Frequency Provider Last Rate Last Dose    lidocaine-EPINEPHrine 1 percent-1:396241 injection 0.5 mL  0.5 mL Intradermal Once Tobias Lee MD        lidocaine-EPINEPHrine 1 percent-1:[id number] injection 2 mL  2 mL Intradermal Once Tobias Lee MD           ALLERGIES:   No Known Allergies    SOCIAL HISTORY:  Social History     Tobacco Use    Smoking status: Never Smoker    Smokeless tobacco: Former User   Substance Use Topics    Alcohol use: No       REVIEW OF SYSTEMS:  Review of Systems   Constitutional: Negative. Skin:Denies any new changing, growing or bleeding lesions or rashes except as described in the HPI     PHYSICAL EXAM:   BP (!) 149/94 (Site: Right Upper Arm, Position: Sitting, Cuff Size: Medium Adult)   Pulse 89   Temp 97.6 °F (36.4 °C)   Ht 5' 8\" (1.727 m)   Wt 168 lb 6.4 oz (76.4 kg)   SpO2 94%   BMI 25.61 kg/m²     General Exam:  General Appearance: No acute distress, Well nourished     Neuro: Alert and oriented to person, place and time  Psych: Normal affect   Lymph Node: Not performed    Cutaneous Exam: Performed as documented in clinic note below. Total skin excluding undergarment areas, which includes the head/face, neck, both arms, chest, back, abdomen, both legs, digits and/or nails, was examined. Pertinent Physical Exam Findings:  Physical Exam  Skin:            Comments: Eczema on the neck, arms and legs 20% BSA    Actinic damage of the face, neck, trunk and upper and lower extremities           Medical Necessity of Exam Performed:   Widespread Rash    Additional Diagnostic Testing performed during exam: Not performed ,  Not performed    ASSESSMENT:   Diagnosis Orders   1. Neoplasm of uncertain behavior of skin  NY TANGENTIAL BIOPSY SKIN SINGLE LESION    Surgical Pathology    NY TANGENTIAL BIOPSY SKIN EA SEP/ADDITIONAL LESION   2. Actinic skin damage     3. Intrinsic atopic dermatitis         Plan of Action is as Follows:  Assessment 1. Neoplasm of uncertain behavior of skin  Shave Biopsy: After verbal consent including the risks of bleeding infection and scar and cleaning with alcohol the lesions on the chest and leg was anesthetized with (LMX AND/OR 1% lidocaine with epinephrine) and was removed with a dermablade.  Hemostasis was achieved with aluminum chloride and Vaseline and a bandage were applied.  - SC TANGENTIAL BIOPSY SKIN SINGLE LESION  - Surgical Pathology; Future  - SC TANGENTIAL BIOPSY SKIN EA SEP/ADDITIONAL LESION    2. Actinic skin damage  Discussed sunscreen and sun protection - recommend SPF 30 or greater sunscreen applied every 2-3 hours, sun protective clothing and avoidance of peak sun. 3. Intrinsic atopic dermatitis  - Biopsy confirmed  - Failed halobetasol  - Widespread  - Protopic 0.1% BID  - Pursue PA for dupixent          Patient Instructions   1. Apply protopic to active areas of rash twice daily  2. Await prior auth for dupixent    BIOPSY WOUND CARE    A biopsy is where a small piece of skin tissue is removed and examined by a pathologist.  When a biopsy is done, there is a small wound site that requires proper care to prevent infection and scarring. Some biopsies require sutures and their removal.    How to Care for Biopsy Wound    A.  Leave band-aid or dressing on for 24 hours. B. Wash two times a day with soap and water. C.  Let the wound air dry, then apply Vaseline ointment and cover with a Band-Aid       unless otherwise instructed by your provider. D. If there is slight discomfort, you may give acetaminophen or ibuprofen. Bleeding:  Every effort is made to stop bleeding prior to you leaving the dermatology clinic. Unfortunately, people will occasionally start to bleed after leaving the clinic. If you start to bleed hold firm pressure for 15 minutes to the area. If you are on blood thinners I recommend keeping a product called wound seal (can buy at any drug store) at home as this can be a useful adjunct to stop bleeding. If after holding pressure for 15 minutes and/or applying wound seal the bleeding does not stop please call the dermatology clinic. When To Call the Doctor    Call the Dermatology Clinic or your doctor if any of the following occur:    A. Redness and swelling  B.   Tenderness and warm to touch  C.  Drainage from and  how to properly store and throw away (dispose of) used DUPIXENT pre-filled syringes. ? Use DUPIXENT exactly as prescribed by your healthcare provider. ? Romelle Orchard comes as a single-dose pre-filled syringe with needle shield. ? Romelle Orchard is given as an injection under the skin (subcutaneous injection). ? If your healthcare provider decides that you or a caregiver can give the injections of Romelle Orchard, you or your caregiver should receive  training on the right way to prepare and inject Romelle Orchard. Do not try to inject Romelle Orchard until you have been shown the right way by  your healthcare provider. ? If you miss a dose of DUPIXENT, give the injection within 7 days from the missed dose, then continue with the original schedule. If the  missed dose is not given within 7 days, wait until the next scheduled dose to give your DUPIXENT injection. ? If you inject more DUPIXENT than prescribed, call your healthcare provider right away. ? Your healthcare provider may prescribe other topical medicines to use with Romelle Orchard. Use the other prescribed topical medicines  exactly as your healthcare provider tells you to. What are the possible side effects of DUPIXENT? DUPIXENT can cause serious side effects, including:  ? Allergic reactions. Stop using Romelle Orchard and go to the nearest hospital emergency room if you get any of the following symptoms:   fever   general ill feeling   swollen lymph nodes   hives   itching   joint pain   skin rash  ? Eye problems. Tell your healthcare provider if you have any new or worsening eye problems, including eye pain or changes in vision. The most common side effects of DUPIXENT include:  ? injection site reactions  ? eye and eyelid inflammation, including redness, swelling and itching  ? cold sores in your mouth or on your lips  Tell your healthcare provider if you have any side effect that bothers you or that does not go away.   These are not all of the possible side effects of Kyleview. Call your doctor for medical advice about side effects. You may report side effects to FDA at 6-816-HFK-9339. General information about the safe and effective use of DUPIXENT. Medicines are sometimes prescribed for purposes other than those listed in a Patient Information leaflet. Do not use DUPIXENT for a  condition for which it was not prescribed. Do not give DUPIXENT to other people, even if they have the same symptoms that you have. It  may harm them. You can ask your pharmacist or healthcare provider for information about Kyleview that is written for health professionals. What are the ingredients in Kyleview? Active ingredient: dupilumab  Inactive ingredients: L-arginine hydrochloride, L-histidine, polysorbate 80, sodium acetate, sucrose, and water for injection. Manufactured by: [address] [place] [address]., [address]. License No. [id number]  Marketed by: Becca Daley, [address] [place] [address] and [address] [place] [address]  [address] is a registered trademark of [address] / © 2017 [address] [place] [address]. / Becca Britt. All  rights reserved. For more information about DUPIXENT, go to www. [employer] or call 9- 571-DUPIXLEIGHTON (7-699.854.9266). This Patient Information has been approved by the U.S. Food and Drug Administration. Issued: March 2017  [id number]        Photo surveillance performed: Yes    Follow-up: to start dupixent    This note was created with the assistance of aspeech-recognition program.  Although the intention is to generate a document that actually reflects thecontent of the visit, no guarantees can be provided that every mistake has been identified and corrected by editing.     Electronically signed by Avery Paula MD on 7/29/20 at 4:14 PM EDT

## 2020-08-03 ENCOUNTER — TELEPHONE (OUTPATIENT)
Dept: DERMATOLOGY | Age: 60
End: 2020-08-03

## 2020-08-03 LAB — DERMATOLOGY PATHOLOGY REPORT: NORMAL

## 2020-08-03 NOTE — TELEPHONE ENCOUNTER
[facility] specialty called to request the dosing information, it was not filled out on the request form. Please advise.

## 2020-08-20 ENCOUNTER — TELEPHONE (OUTPATIENT)
Dept: DERMATOLOGY | Age: 60
End: 2020-08-20

## 2020-09-30 NOTE — TELEPHONE ENCOUNTER
Pts wife called stating that the Dupixent maintenance dose was approved but not the starter dose.  Please advise

## 2020-10-02 NOTE — TELEPHONE ENCOUNTER
[facility]  [phone]  Elaine  states that they should be able to fill it.  They have resolved this issue in the computer    Pt notified

## 2020-10-12 RX ORDER — LISINOPRIL 10 MG/1
TABLET ORAL
Qty: 90 TABLET | Refills: 1 | Status: SHIPPED | OUTPATIENT
Start: 2020-10-12 | End: 2021-04-06 | Stop reason: SDUPTHER

## 2020-10-12 NOTE — TELEPHONE ENCOUNTER
Last visit: 10/04/19  Last Med refill: 04/15/20  *sent message to  to schedule patient    Next Visit Date:  No future appointments.     Health Maintenance   Topic Date Due    Hepatitis C screen  1960    HIV screen  11/10/1976    DTaP/Tdap/Td vaccine (1 - Tdap) 11/10/1980    Shingles Vaccine (1 of 2) 11/10/2011    Potassium monitoring  12/27/2018    Creatinine monitoring  12/27/2018    Flu vaccine (1) 09/01/2020    Lipid screen  12/27/2022    Colon cancer screen colonoscopy  01/27/2023    Hepatitis A vaccine  Aged Out    Hepatitis B vaccine  Aged Out    Hib vaccine  Aged Out    Meningococcal (ACWY) vaccine  Aged Out    Pneumococcal 0-64 years Vaccine  Aged Out       No results found for: LABA1C          ( goal A1C is < 7)   No results found for: LABMICR  LDL Cholesterol (mg/dL)   Date Value   12/27/2017 117     LDL Calculated (mg/dL)   Date Value   05/23/2014 131   04/03/2012 152       (goal LDL is <100)   AST (U/L)   Date Value   12/27/2017 18     ALT (U/L)   Date Value   12/27/2017 20     BUN (mg/dL)   Date Value   12/27/2017 16     BP Readings from Last 3 Encounters:   07/29/20 (!) 149/94   12/13/19 (!) 153/84   11/13/19 133/89          (goal 120/80)    All Future Testing planned in [facility]  Lab Frequency Next Occurrence   Surgical Pathology Once 07/29/2020               Patient Active Problem List:     HTN (hypertension)     Serrated adenoma of colon     Psoriasis

## 2021-01-04 ENCOUNTER — HOSPITAL ENCOUNTER (OUTPATIENT)
Age: 61
Setting detail: SPECIMEN
Discharge: HOME | End: 2021-01-04
Payer: COMMERCIAL

## 2021-01-04 ENCOUNTER — OFFICE VISIT (OUTPATIENT)
Dept: FAMILY MEDICINE CLINIC | Age: 61
End: 2021-01-04
Payer: COMMERCIAL

## 2021-01-04 VITALS
DIASTOLIC BLOOD PRESSURE: 78 MMHG | WEIGHT: 173 LBS | HEIGHT: 68 IN | OXYGEN SATURATION: 97 % | TEMPERATURE: 98.4 F | HEART RATE: 82 BPM | SYSTOLIC BLOOD PRESSURE: 135 MMHG | BODY MASS INDEX: 26.22 KG/M2 | RESPIRATION RATE: 14 BRPM

## 2021-01-04 DIAGNOSIS — I10 ESSENTIAL HYPERTENSION: ICD-10-CM

## 2021-01-04 DIAGNOSIS — Z13.1 ENCOUNTER FOR SCREENING FOR DIABETES MELLITUS: Primary | ICD-10-CM

## 2021-01-04 DIAGNOSIS — Z11.59 ENCOUNTER FOR HEPATITIS C SCREENING TEST FOR LOW RISK PATIENT: ICD-10-CM

## 2021-01-04 DIAGNOSIS — Z13.1 ENCOUNTER FOR SCREENING FOR DIABETES MELLITUS: ICD-10-CM

## 2021-01-04 DIAGNOSIS — L40.9 PSORIASIS: ICD-10-CM

## 2021-01-04 LAB
ALBUMIN SERPL-MCNC: 4.1 G/DL (ref 3.5–5.2)
ALBUMIN/GLOBULIN RATIO: 1.4 (ref 1–2.5)
ALP BLD-CCNC: 114 U/L (ref 40–129)
ALT SERPL-CCNC: 28 U/L (ref 5–41)
ANION GAP SERPL CALCULATED.3IONS-SCNC: 10 MMOL/L (ref 9–17)
AST SERPL-CCNC: 27 U/L
BILIRUB SERPL-MCNC: 0.3 MG/DL (ref 0.3–1.2)
BUN BLDV-MCNC: 14 MG/DL (ref 6–20)
BUN/CREAT BLD: NORMAL (ref 9–20)
CALCIUM SERPL-MCNC: 9.1 MG/DL (ref 8.6–10.4)
CHLORIDE BLD-SCNC: 104 MMOL/L (ref 98–107)
CO2: 27 MMOL/L (ref 20–31)
CREAT SERPL-MCNC: 0.76 MG/DL (ref 0.7–1.2)
GFR AFRICAN AMERICAN: >60 ML/MIN
GFR NON-AFRICAN AMERICAN: >60 ML/MIN
GFR SERPL CREATININE-BSD FRML MDRD: NORMAL ML/MIN/{1.73_M2}
GFR SERPL CREATININE-BSD FRML MDRD: NORMAL ML/MIN/{1.73_M2}
GLUCOSE FASTING: 84 MG/DL (ref 70–99)
HEPATITIS C ANTIBODY: NONREACTIVE
POTASSIUM SERPL-SCNC: 4.7 MMOL/L (ref 3.7–5.3)
SODIUM BLD-SCNC: 141 MMOL/L (ref 135–144)
TOTAL PROTEIN: 7 G/DL (ref 6.4–8.3)

## 2021-01-04 PROCEDURE — 99213 OFFICE O/P EST LOW 20 MIN: CPT | Performed by: STUDENT IN AN ORGANIZED HEALTH CARE EDUCATION/TRAINING PROGRAM

## 2021-01-04 PROCEDURE — G8419 CALC BMI OUT NRM PARAM NOF/U: HCPCS | Performed by: STUDENT IN AN ORGANIZED HEALTH CARE EDUCATION/TRAINING PROGRAM

## 2021-01-04 PROCEDURE — G8484 FLU IMMUNIZE NO ADMIN: HCPCS | Performed by: STUDENT IN AN ORGANIZED HEALTH CARE EDUCATION/TRAINING PROGRAM

## 2021-01-04 PROCEDURE — 1036F TOBACCO NON-USER: CPT | Performed by: STUDENT IN AN ORGANIZED HEALTH CARE EDUCATION/TRAINING PROGRAM

## 2021-01-04 PROCEDURE — 3017F COLORECTAL CA SCREEN DOC REV: CPT | Performed by: STUDENT IN AN ORGANIZED HEALTH CARE EDUCATION/TRAINING PROGRAM

## 2021-01-04 PROCEDURE — G8427 DOCREV CUR MEDS BY ELIG CLIN: HCPCS | Performed by: STUDENT IN AN ORGANIZED HEALTH CARE EDUCATION/TRAINING PROGRAM

## 2021-01-04 RX ORDER — HALOBETASOL PROPIONATE 0.05 %
OINTMENT (GRAM) TOPICAL
Qty: 50 G | Refills: 1 | Status: CANCELLED | OUTPATIENT
Start: 2021-01-04

## 2021-01-04 RX ORDER — LISINOPRIL 10 MG/1
TABLET ORAL
Qty: 90 TABLET | Refills: 1 | Status: CANCELLED | OUTPATIENT
Start: 2021-01-04

## 2021-01-04 RX ORDER — HALOBETASOL PROPIONATE 0.05 %
OINTMENT (GRAM) TOPICAL
Qty: 50 G | Refills: 1 | Status: SHIPPED | OUTPATIENT
Start: 2021-01-04 | End: 2021-08-30 | Stop reason: SDUPTHER

## 2021-01-04 ASSESSMENT — ENCOUNTER SYMPTOMS
CHEST TIGHTNESS: 0
SHORTNESS OF BREATH: 0
WHEEZING: 0
DIARRHEA: 0
CONSTIPATION: 0
COUGH: 0
ABDOMINAL PAIN: 0
BACK PAIN: 0
ABDOMINAL DISTENTION: 0
SORE THROAT: 0

## 2021-01-04 ASSESSMENT — PATIENT HEALTH QUESTIONNAIRE - PHQ9
2. FEELING DOWN, DEPRESSED OR HOPELESS: 0
SUM OF ALL RESPONSES TO PHQ QUESTIONS 1-9: 0

## 2021-01-04 NOTE — PROGRESS NOTES
Curt Jay (:  1960) is a 62 y.o. male,Established patient, here for evaluation of the following chief complaint(s):  New Patient (NTP ), Psoriasis (Needs ointment refill-pended for your approval ), and Health Maintenance (Patient refused flu vaccine )      ASSESSMENT/PLAN:  1. Encounter for screening for diabetes mellitus  2. Psoriasis  3. Essential hypertension    Has psoriasis Dupixent did not work for him actually made his skin worse  Uses halobetasol needs a refill on that  Blood pressure is almost at goal he is hovering in the 140/190 range at home is only on 5 mg lisinopril  Would recommend that he start taking 10 mg he can double up on his 5 mg pill if that works I will send a new prescription for 10 mg  Complete metabolic panel ordered he does not have recent lab work and given that he takes an ACE inhibitor would be prudent to check his kidney function in general    No follow-ups on file. SUBJECTIVE/OBJECTIVE:  HPI: 18-year-old male history of psoriasis previously on Dupixent no longer on takes halobetasol sees a dermatologist  Here for medication refills  Blood pressure is almost to goal  No complaints otherwise    Recently saw dermatologist  Had biopsy of skin lesion that was suspicious for neoplasm returned a seborrheic keratosis  Does have actinic keratosis and eczema, which is very well controlled but he is following with dermatology    Review of Systems   Constitutional: Negative for chills, fatigue and fever. HENT: Negative for congestion, postnasal drip and sore throat. Eyes: Negative for visual disturbance. Respiratory: Negative for cough, chest tightness, shortness of breath and wheezing. Cardiovascular: Negative. Gastrointestinal: Negative for abdominal distention, abdominal pain, constipation and diarrhea. Genitourinary: Negative for difficulty urinating, dysuria, frequency and urgency. Musculoskeletal: Negative for arthralgias, back pain and joint swelling. Skin: Positive for rash. Neurological: Negative for dizziness, weakness and light-headedness. Psychiatric/Behavioral: Negative for agitation, decreased concentration and sleep disturbance.   :     Physical Exam  Vitals signs and nursing note reviewed. Constitutional:       Appearance: Normal appearance. HENT:      Head: Normocephalic and atraumatic. Neck:      Musculoskeletal: Normal range of motion. Cardiovascular:      Rate and Rhythm: Normal rate and regular rhythm. Pulses: Normal pulses. Heart sounds: Normal heart sounds. Musculoskeletal: Normal range of motion. Neurological:      General: No focal deficit present. Mental Status: He is alert and oriented to person, place, and time. Mental status is at baseline. Psychiatric:         Mood and Affect: Mood normal.         Behavior: Behavior normal.         Thought Content: Thought content normal.         Judgment: Judgment normal.           On this date 01/04/21 I have spent 20 minutes reviewing previous notes, test results and face to face with the patient discussing the diagnosis and importance of compliance with the treatment plan. An electronic signature was used to authenticate this note.     --Jacki Celis MD

## 2021-04-06 DIAGNOSIS — I10 ESSENTIAL HYPERTENSION: ICD-10-CM

## 2021-04-06 RX ORDER — LISINOPRIL 10 MG/1
TABLET ORAL
Qty: 90 TABLET | Refills: 1 | Status: SHIPPED | OUTPATIENT
Start: 2021-04-06 | End: 2021-08-30 | Stop reason: SDUPTHER

## 2021-08-30 DIAGNOSIS — I10 ESSENTIAL HYPERTENSION: ICD-10-CM

## 2021-08-30 DIAGNOSIS — L40.9 PSORIASIS: ICD-10-CM

## 2021-08-30 RX ORDER — HALOBETASOL PROPIONATE 0.05 %
OINTMENT (GRAM) TOPICAL
Qty: 50 G | Refills: 1 | Status: SHIPPED | OUTPATIENT
Start: 2021-08-30 | End: 2021-12-06

## 2021-08-30 RX ORDER — LISINOPRIL 10 MG/1
TABLET ORAL
Qty: 90 TABLET | Refills: 1 | Status: SHIPPED | OUTPATIENT
Start: 2021-08-30 | End: 2022-04-14

## 2021-08-30 NOTE — TELEPHONE ENCOUNTER
Last visit: 1/4/21  Last Med refill: 4/6/21  Does patient have enough medication for 72 hours: Yes    Next Visit Date:  No future appointments.     Health Maintenance   Topic Date Due    COVID-19 Vaccine (1) Never done    HIV screen  Never done    DTaP/Tdap/Td vaccine (1 - Tdap) Never done    Shingles Vaccine (1 of 2) Never done    Flu vaccine (1) 09/01/2021    Potassium monitoring  01/04/2022    Creatinine monitoring  01/04/2022    Lipid screen  12/27/2022    Colon cancer screen colonoscopy  01/27/2023    Diabetes screen  01/04/2024    Hepatitis C screen  Completed    Hepatitis A vaccine  Aged Out    Hepatitis B vaccine  Aged Out    Hib vaccine  Aged Out    Meningococcal (ACWY) vaccine  Aged Out    Pneumococcal 0-64 years Vaccine  Aged Out       No results found for: LABA1C          ( goal A1C is < 7)   No results found for: LABMICR  LDL Cholesterol (mg/dL)   Date Value   12/27/2017 117     LDL Calculated (mg/dL)   Date Value   05/23/2014 131   04/03/2012 152       (goal LDL is <100)   AST (U/L)   Date Value   01/04/2021 27     ALT (U/L)   Date Value   01/04/2021 28     BUN (mg/dL)   Date Value   01/04/2021 14     BP Readings from Last 3 Encounters:   01/04/21 135/78   07/29/20 (!) 149/94   12/13/19 (!) 153/84          (goal 120/80)    All Future Testing planned in [facility]              Patient Active Problem List:     HTN (hypertension)     Serrated adenoma of colon     Psoriasis

## 2021-12-05 DIAGNOSIS — L40.9 PSORIASIS: ICD-10-CM

## 2021-12-07 NOTE — TELEPHONE ENCOUNTER
Last visit: 1-4-21  Last Med refill: 8-30-21  Does patient have enough medication for 72 hours: Yes    Next Visit Date:  No future appointments.     Health Maintenance   Topic Date Due    COVID-19 Vaccine (1) Never done    HIV screen  Never done    DTaP/Tdap/Td vaccine (1 - Tdap) Never done    Shingles Vaccine (1 of 2) Never done    Flu vaccine (1) Never done    Potassium monitoring  01/04/2022    Creatinine monitoring  01/04/2022    Lipid screen  12/27/2022    Colon cancer screen colonoscopy  01/27/2023    Diabetes screen  01/04/2024    Hepatitis C screen  Completed    Hepatitis A vaccine  Aged Out    Hepatitis B vaccine  Aged Out    Hib vaccine  Aged Out    Meningococcal (ACWY) vaccine  Aged Out    Pneumococcal 0-64 years Vaccine  Aged Out       No results found for: LABA1C          ( goal A1C is < 7)   No results found for: LABMICR  LDL Cholesterol (mg/dL)   Date Value   12/27/2017 117     LDL Calculated (mg/dL)   Date Value   05/23/2014 131   04/03/2012 152       (goal LDL is <100)   AST (U/L)   Date Value   01/04/2021 27     ALT (U/L)   Date Value   01/04/2021 28     BUN (mg/dL)   Date Value   01/04/2021 14     BP Readings from Last 3 Encounters:   01/04/21 135/78   07/29/20 (!) 149/94   12/13/19 (!) 153/84          (goal 120/80)    All Future Testing planned in [facility]              Patient Active Problem List:     HTN (hypertension)     Serrated adenoma of colon     Psoriasis

## 2021-12-09 RX ORDER — HALOBETASOL PROPIONATE 0.05 %
OINTMENT (GRAM) TOPICAL
Qty: 45 G | Refills: 1 | Status: SHIPPED | OUTPATIENT
Start: 2021-12-09 | End: 2022-11-04

## 2022-02-10 ENCOUNTER — TELEPHONE (OUTPATIENT)
Dept: FAMILY MEDICINE CLINIC | Age: 62
End: 2022-02-10

## 2022-02-10 NOTE — TELEPHONE ENCOUNTER
Patient's wife called into the office and said that her and her  are going to be traveling out of country on Saturday 02/12/22. Patient stated that both her and her  had tested positive for Covid the week of 1/10/22 with a home rapid test.  Their symptoms were cold like symptoms and did not have bad symptoms. They would like to know if you are able to write a note stating that they were positive in January to take on their trip with them. She would like a phone call back at [phone] ASAP. Please advise.

## 2022-02-14 DIAGNOSIS — L40.9 PSORIASIS: ICD-10-CM

## 2022-02-14 RX ORDER — HALOBETASOL PROPIONATE 0.05 %
OINTMENT (GRAM) TOPICAL
Qty: 45 G | Refills: 1 | OUTPATIENT
Start: 2022-02-14

## 2022-04-13 DIAGNOSIS — I10 ESSENTIAL HYPERTENSION: ICD-10-CM

## 2022-04-13 NOTE — TELEPHONE ENCOUNTER
Last visit: 01/04/21  Last Med refill: 08/30/21  Does patient have enough medication for 72 hours: Yes    Next Visit Date:  No future appointments.     Health Maintenance   Topic Date Due    COVID-19 Vaccine (1) Never done    HIV screen  Never done    DTaP/Tdap/Td vaccine (1 - Tdap) Never done    Shingles Vaccine (1 of 2) Never done    Depression Screen  01/04/2022    Potassium monitoring  01/04/2022    Creatinine monitoring  01/04/2022    Flu vaccine (Season Ended) 09/01/2022    Lipid screen  12/27/2022    Colorectal Cancer Screen  01/27/2023    Hepatitis C screen  Completed    Hepatitis A vaccine  Aged Out    Hepatitis B vaccine  Aged Out    Hib vaccine  Aged Out    Meningococcal (ACWY) vaccine  Aged Out    Pneumococcal 0-64 years Vaccine  Aged Out       No results found for: LABA1C          ( goal A1C is < 7)   No results found for: LABMICR  LDL Cholesterol (mg/dL)   Date Value   12/27/2017 117     LDL Calculated (mg/dL)   Date Value   05/23/2014 131   04/03/2012 152       (goal LDL is <100)   AST (U/L)   Date Value   01/04/2021 27     ALT (U/L)   Date Value   01/04/2021 28     BUN (mg/dL)   Date Value   01/04/2021 14     BP Readings from Last 3 Encounters:   01/04/21 135/78   07/29/20 (!) 149/94   12/13/19 (!) 153/84          (goal 120/80)    All Future Testing planned in [facility]              Patient Active Problem List:     HTN (hypertension)     Serrated adenoma of colon     Psoriasis

## 2022-04-14 RX ORDER — LISINOPRIL 10 MG/1
TABLET ORAL
Qty: 90 TABLET | Refills: 1 | Status: SHIPPED | OUTPATIENT
Start: 2022-04-14 | End: 2022-10-13 | Stop reason: SDUPTHER

## 2022-10-06 DIAGNOSIS — I10 ESSENTIAL HYPERTENSION: ICD-10-CM

## 2022-10-06 RX ORDER — LISINOPRIL 10 MG/1
TABLET ORAL
Qty: 90 TABLET | Refills: 1 | OUTPATIENT
Start: 2022-10-06

## 2022-10-12 DIAGNOSIS — L40.9 PSORIASIS: ICD-10-CM

## 2022-10-13 DIAGNOSIS — I10 ESSENTIAL HYPERTENSION: ICD-10-CM

## 2022-10-13 RX ORDER — LISINOPRIL 10 MG/1
TABLET ORAL
Qty: 90 TABLET | Refills: 0 | Status: SHIPPED | OUTPATIENT
Start: 2022-10-13

## 2022-10-13 RX ORDER — HALOBETASOL PROPIONATE 0.05 %
OINTMENT (GRAM) TOPICAL
Qty: 45 G | Refills: 1 | OUTPATIENT
Start: 2022-10-13

## 2022-10-13 NOTE — TELEPHONE ENCOUNTER
Last visit: 1/4/21  Last Med refill: 4/14/22  Does patient have enough medication for 72 hours: No: pt is out    Next Visit Date:  Future Appointments   Date Time Provider Ed Hilton   12/2/2022  3:45 PM TROY Eastman NP [address] Maintenance   Topic Date Due    COVID-19 Vaccine (1) Never done    HIV screen  Never done    DTaP/Tdap/Td vaccine (1 - Tdap) Never done    Shingles vaccine (1 of 2) Never done    Depression Screen  01/04/2022    Flu vaccine (1) Never done    Lipids  12/27/2022    Colorectal Cancer Screen  01/27/2023    Hepatitis C screen  Completed    Hepatitis A vaccine  Aged Out    Hib vaccine  Aged Out    Meningococcal (ACWY) vaccine  Aged Out    Pneumococcal 0-64 years Vaccine  Aged Out       No results found for: LABA1C          ( goal A1C is < 7)   No results found for: LABMICR  LDL Cholesterol (mg/dL)   Date Value   12/27/2017 117     LDL Calculated (mg/dL)   Date Value   05/23/2014 131   04/03/2012 152       (goal LDL is <100)   AST (U/L)   Date Value   01/04/2021 27     ALT (U/L)   Date Value   01/04/2021 28     BUN (mg/dL)   Date Value   01/04/2021 14     BP Readings from Last 3 Encounters:   01/04/21 135/78   07/29/20 (!) 149/94   12/13/19 (!) 153/84          (goal 120/80)    All Future Testing planned in [facility]              Patient Active Problem List:     HTN (hypertension)     Serrated adenoma of colon     Psoriasis

## 2022-11-03 DIAGNOSIS — L40.9 PSORIASIS: ICD-10-CM

## 2022-11-03 NOTE — TELEPHONE ENCOUNTER
Last visit: 1/4/21  Last Med refill: 12/9/21  Does patient have enough medication for 72 hours: Yes    Next Visit Date:  Future Appointments   Date Time Provider Ed Hilton   12/2/2022  3:45 PM TROY French NP [address] Maintenance   Topic Date Due    COVID-19 Vaccine (1) Never done    HIV screen  Never done    DTaP/Tdap/Td vaccine (1 - Tdap) Never done    Shingles vaccine (1 of 2) Never done    Depression Screen  01/04/2022    Flu vaccine (1) Never done    Lipids  12/27/2022    Colorectal Cancer Screen  01/27/2023    Hepatitis C screen  Completed    Hepatitis A vaccine  Aged Out    Hib vaccine  Aged Out    Meningococcal (ACWY) vaccine  Aged Out    Pneumococcal 0-64 years Vaccine  Aged Out       No results found for: LABA1C          ( goal A1C is < 7)   No results found for: LABMICR  LDL Cholesterol (mg/dL)   Date Value   12/27/2017 117     LDL Calculated (mg/dL)   Date Value   05/23/2014 131   04/03/2012 152       (goal LDL is <100)   AST (U/L)   Date Value   01/04/2021 27     ALT (U/L)   Date Value   01/04/2021 28     BUN (mg/dL)   Date Value   01/04/2021 14     BP Readings from Last 3 Encounters:   01/04/21 135/78   07/29/20 (!) 149/94   12/13/19 (!) 153/84          (goal 120/80)    All Future Testing planned in [facility]              Patient Active Problem List:     HTN (hypertension)     Serrated adenoma of colon     Psoriasis

## 2022-11-04 RX ORDER — HALOBETASOL PROPIONATE 0.05 %
OINTMENT (GRAM) TOPICAL
Qty: 45 G | Refills: 0 | Status: SHIPPED | OUTPATIENT
Start: 2022-11-04

## 2022-12-13 DIAGNOSIS — L40.9 PSORIASIS: ICD-10-CM

## 2022-12-15 RX ORDER — HALOBETASOL PROPIONATE 0.05 %
OINTMENT (GRAM) TOPICAL
Qty: 45 G | Refills: 0 | Status: SHIPPED | OUTPATIENT
Start: 2022-12-15

## 2022-12-15 NOTE — TELEPHONE ENCOUNTER
LOV  1/4/21  LRF 11/4/22  RTO Scheduled    Health Maintenance   Topic Date Due    COVID-19 Vaccine (1) Never done    HIV screen  Never done    DTaP/Tdap/Td vaccine (1 - Tdap) Never done    Shingles vaccine (1 of 2) Never done    Depression Screen  01/04/2022    Flu vaccine (1) Never done    Lipids  12/27/2022    Colorectal Cancer Screen  01/27/2023    Hepatitis C screen  Completed    Hepatitis A vaccine  Aged Out    Hib vaccine  Aged Out    Meningococcal (ACWY) vaccine  Aged Out    Pneumococcal 0-64 years Vaccine  Aged Out             (applicable per patient's age: Cancer Screenings, Depression Screening, Fall Risk Screening, Immunizations)    LDL Cholesterol (mg/dL)   Date Value   12/27/2017 117     LDL Calculated (mg/dL)   Date Value   05/23/2014 131     AST (U/L)   Date Value   01/04/2021 27     ALT (U/L)   Date Value   01/04/2021 28     BUN (mg/dL)   Date Value   01/04/2021 14      (goal A1C is < 7)   (goal LDL is <100) need 30-50% reduction from baseline     BP Readings from Last 3 Encounters:   01/04/21 135/78   07/29/20 (!) 149/94   12/13/19 (!) 153/84    (goal /80)      All Future Testing planned in [facility]:      Next Visit Date:  Future Appointments   Date Time Provider Ed Hilton   1/25/2023  3:45 PM TROY Bates - DONAL Kraus            Patient Active Problem List:     HTN (hypertension)     Serrated adenoma of colon     Psoriasis,

## 2023-01-12 DIAGNOSIS — I10 ESSENTIAL HYPERTENSION: ICD-10-CM

## 2023-01-13 RX ORDER — LISINOPRIL 10 MG/1
TABLET ORAL
Qty: 90 TABLET | Refills: 1 | Status: SHIPPED | OUTPATIENT
Start: 2023-01-13

## 2023-01-13 NOTE — TELEPHONE ENCOUNTER
Last visit: 1/4/2021    Last Med refill: 10/13/2022  Does patient have enough medication for 72 hours: NO   Scripts end 1/13    Next Visit Date:  Future Appointments   Date Time Provider Ed Hilton   1/25/2023  3:45 PM TROY Guthrie NP [address] Maintenance   Topic Date Due    COVID-19 Vaccine (1) Never done    Depression Screen  Never done    HIV screen  Never done    DTaP/Tdap/Td vaccine (1 - Tdap) Never done    Shingles vaccine (1 of 2) Never done    Flu vaccine (1) Never done    Lipids  12/27/2022    Colorectal Cancer Screen  01/27/2023    Hepatitis C screen  Completed    Hepatitis A vaccine  Aged Out    Hib vaccine  Aged Out    Meningococcal (ACWY) vaccine  Aged Out    Pneumococcal 0-64 years Vaccine  Aged Out       No results found for: LABA1C          ( goal A1C is < 7)   No results found for: LABMICR  LDL Cholesterol (mg/dL)   Date Value   12/27/2017 117     LDL Calculated (mg/dL)   Date Value   05/23/2014 131   04/03/2012 152       (goal LDL is <100)   AST (U/L)   Date Value   01/04/2021 27     ALT (U/L)   Date Value   01/04/2021 28     BUN (mg/dL)   Date Value   01/04/2021 14     BP Readings from Last 3 Encounters:   01/04/21 135/78   07/29/20 (!) 149/94   12/13/19 (!) 153/84          (goal 120/80)    All Future Testing planned in [facility]              Patient Active Problem List:     HTN (hypertension)     Serrated adenoma of colon     Psoriasis

## 2023-01-25 ENCOUNTER — TELEMEDICINE (OUTPATIENT)
Dept: FAMILY MEDICINE CLINIC | Age: 63
End: 2023-01-25
Payer: COMMERCIAL

## 2023-01-25 DIAGNOSIS — I10 ESSENTIAL HYPERTENSION: Primary | ICD-10-CM

## 2023-01-25 DIAGNOSIS — Z13.220 LIPID SCREENING: ICD-10-CM

## 2023-01-25 DIAGNOSIS — Z12.5 PROSTATE CANCER SCREENING: ICD-10-CM

## 2023-01-25 DIAGNOSIS — Z13.1 DIABETES MELLITUS SCREENING: ICD-10-CM

## 2023-01-25 PROCEDURE — 3017F COLORECTAL CA SCREEN DOC REV: CPT | Performed by: NURSE PRACTITIONER

## 2023-01-25 PROCEDURE — G8427 DOCREV CUR MEDS BY ELIG CLIN: HCPCS | Performed by: NURSE PRACTITIONER

## 2023-01-25 PROCEDURE — 99214 OFFICE O/P EST MOD 30 MIN: CPT | Performed by: NURSE PRACTITIONER

## 2023-01-25 SDOH — ECONOMIC STABILITY: FOOD INSECURITY: WITHIN THE PAST 12 MONTHS, THE FOOD YOU BOUGHT JUST DIDN'T LAST AND YOU DIDN'T HAVE MONEY TO GET MORE.: NEVER TRUE

## 2023-01-25 SDOH — ECONOMIC STABILITY: FOOD INSECURITY: WITHIN THE PAST 12 MONTHS, YOU WORRIED THAT YOUR FOOD WOULD RUN OUT BEFORE YOU GOT MONEY TO BUY MORE.: NEVER TRUE

## 2023-01-25 ASSESSMENT — PATIENT HEALTH QUESTIONNAIRE - PHQ9
SUM OF ALL RESPONSES TO PHQ9 QUESTIONS 1 & 2: 0
SUM OF ALL RESPONSES TO PHQ QUESTIONS 1-9: 0
2. FEELING DOWN, DEPRESSED OR HOPELESS: 0
SUM OF ALL RESPONSES TO PHQ QUESTIONS 1-9: 0
1. LITTLE INTEREST OR PLEASURE IN DOING THINGS: 0
SUM OF ALL RESPONSES TO PHQ QUESTIONS 1-9: 0
SUM OF ALL RESPONSES TO PHQ QUESTIONS 1-9: 0

## 2023-01-25 ASSESSMENT — ANXIETY QUESTIONNAIRES
3. WORRYING TOO MUCH ABOUT DIFFERENT THINGS: 0
4. TROUBLE RELAXING: 0
1. FEELING NERVOUS, ANXIOUS, OR ON EDGE: 0
7. FEELING AFRAID AS IF SOMETHING AWFUL MIGHT HAPPEN: 0
IF YOU CHECKED OFF ANY PROBLEMS ON THIS QUESTIONNAIRE, HOW DIFFICULT HAVE THESE PROBLEMS MADE IT FOR YOU TO DO YOUR WORK, TAKE CARE OF THINGS AT HOME, OR GET ALONG WITH OTHER PEOPLE: NOT DIFFICULT AT ALL
2. NOT BEING ABLE TO STOP OR CONTROL WORRYING: 0
5. BEING SO RESTLESS THAT IT IS HARD TO SIT STILL: 0
GAD7 TOTAL SCORE: 0
6. BECOMING EASILY ANNOYED OR IRRITABLE: 0

## 2023-01-25 ASSESSMENT — SOCIAL DETERMINANTS OF HEALTH (SDOH): HOW HARD IS IT FOR YOU TO PAY FOR THE VERY BASICS LIKE FOOD, HOUSING, MEDICAL CARE, AND HEATING?: NOT HARD AT ALL

## 2023-01-25 NOTE — PROGRESS NOTES
Najma Cade (:  1960) is a Established patient, here for evaluation of the following:    Assessment & Plan   Below is the assessment and plan developed based on review of pertinent history, physical exam, labs, studies, and medications. 1. Essential hypertension  -     CBC; Future  -     Comprehensive Metabolic Panel; Future  -     PSA Screening; Future  2. Lipid screening  -     Lipid Panel; Future  3. Prostate cancer screening  -     PSA Screening; Future  4. Diabetes mellitus screening  -     Hemoglobin A1C; Future  Return in about 6 months (around 2023), or if symptoms worsen or fail to improve, for medication follow up. Subjective   Establishing care with this provider  Reports BP is still elevated in 140s consistently. Ordered a new bp monitor. Will check for a week and reports readings to office. Reports a poor diet. Does drink water but not enough. Drinks one cup of coffee in morning  Little exercise  Denies smoking, drug use. Daily alcohol intake vodka is drink of choice    Review of Systems   Constitutional:  Negative for chills, fatigue, fever and unexpected weight change. Daily alcohol intake   HENT:  Negative for congestion, ear pain, postnasal drip, rhinorrhea and sore throat. Eyes:  Negative for photophobia, pain and visual disturbance. Respiratory:  Negative for cough, chest tightness, shortness of breath and wheezing. Cardiovascular:  Negative for chest pain and palpitations. HTN   Gastrointestinal:  Negative for abdominal distention, abdominal pain, blood in stool, constipation, diarrhea, nausea and vomiting. Endocrine: Negative for polydipsia and polyuria. Genitourinary:  Negative for dysuria, frequency, hematuria and urgency. Musculoskeletal:  Negative for back pain, gait problem, myalgias and neck stiffness. Allergic/Immunologic: Negative for environmental allergies and food allergies.    Neurological:  Negative for dizziness, seizures, syncope, weakness and headaches. Hematological:  Negative for adenopathy. Psychiatric/Behavioral:  Negative for dysphoric mood and suicidal ideas. The patient is not nervous/anxious. Objective   Patient-Reported Vitals  No data recorded       Physical Exam  Vitals and nursing note reviewed. Constitutional:       General: He is not in acute distress. Appearance: He is not ill-appearing. HENT:      Head: Normocephalic. Nose: Nose normal.      Mouth/Throat:      Lips: Pink. Pulmonary:      Effort: Pulmonary effort is normal. No respiratory distress. Neurological:      Mental Status: He is alert and oriented to person, place, and time. Psychiatric:         Attention and Perception: Attention normal.         Speech: Speech normal.         Behavior: Behavior is cooperative. Laddie Dakins, was evaluated through a synchronous (real-time) audio-video encounter. The patient (or guardian if applicable) is aware that this is a billable service, which includes applicable co-pays. This Virtual Visit was conducted with patient's (and/or legal guardian's) consent. The visit was conducted pursuant to the emergency declaration under the [address] [place] [address], [address] [place] [address] authority and the [employer] and [employer] General Act. Patient identification was verified, and a caregiver was present when appropriate. The patient was located at Home: [address] [facility] [address]. Provider was located at Home ([facility]): [place].         --TROY Guthrie NP

## 2023-02-05 ASSESSMENT — ENCOUNTER SYMPTOMS
ABDOMINAL DISTENTION: 0
COUGH: 0
RHINORRHEA: 0
NAUSEA: 0
SORE THROAT: 0
PHOTOPHOBIA: 0
WHEEZING: 0
SHORTNESS OF BREATH: 0
EYE PAIN: 0
VOMITING: 0
CONSTIPATION: 0
DIARRHEA: 0
BACK PAIN: 0
CHEST TIGHTNESS: 0
BLOOD IN STOOL: 0
ABDOMINAL PAIN: 0

## 2023-02-27 ENCOUNTER — HOSPITAL ENCOUNTER (OUTPATIENT)
Age: 63
Setting detail: SPECIMEN
Discharge: HOME | End: 2023-02-27

## 2023-02-27 DIAGNOSIS — Z12.5 PROSTATE CANCER SCREENING: ICD-10-CM

## 2023-02-27 DIAGNOSIS — Z13.1 DIABETES MELLITUS SCREENING: ICD-10-CM

## 2023-02-27 DIAGNOSIS — Z13.220 LIPID SCREENING: ICD-10-CM

## 2023-02-27 DIAGNOSIS — I10 ESSENTIAL HYPERTENSION: ICD-10-CM

## 2023-02-27 LAB
ALBUMIN SERPL-MCNC: 4.2 G/DL (ref 3.5–5.2)
ALBUMIN/GLOBULIN RATIO: 1.5 (ref 1–2.5)
ALP SERPL-CCNC: 101 U/L (ref 40–129)
ALT SERPL-CCNC: 20 U/L (ref 5–41)
ANION GAP SERPL CALCULATED.3IONS-SCNC: 13 MMOL/L (ref 9–17)
AST SERPL-CCNC: 22 U/L
BILIRUB SERPL-MCNC: 0.6 MG/DL (ref 0.3–1.2)
BUN SERPL-MCNC: 20 MG/DL (ref 8–23)
CALCIUM SERPL-MCNC: 9.3 MG/DL (ref 8.6–10.4)
CHLORIDE SERPL-SCNC: 101 MMOL/L (ref 98–107)
CHOLEST SERPL-MCNC: 228 MG/DL
CHOLESTEROL/HDL RATIO: 2.9
CO2 SERPL-SCNC: 24 MMOL/L (ref 20–31)
CREAT SERPL-MCNC: 0.73 MG/DL (ref 0.7–1.2)
GFR SERPL CREATININE-BSD FRML MDRD: >60 ML/MIN/1.73M2
GLUCOSE SERPL-MCNC: 90 MG/DL (ref 70–99)
HCT VFR BLD AUTO: 43.5 % (ref 40.7–50.3)
HDLC SERPL-MCNC: 79 MG/DL
HGB BLD-MCNC: 14.5 G/DL (ref 13–17)
LDLC SERPL CALC-MCNC: 131 MG/DL (ref 0–130)
MCH RBC QN AUTO: 31.2 PG (ref 25.2–33.5)
MCHC RBC AUTO-ENTMCNC: 33.3 G/DL (ref 28.4–34.8)
MCV RBC AUTO: 93.5 FL (ref 82.6–102.9)
NRBC AUTOMATED: 0 PER 100 WBC
PDW BLD-RTO: 12.6 % (ref 11.8–14.4)
PLATELET # BLD AUTO: 354 K/UL (ref 138–453)
PMV BLD AUTO: 10.1 FL (ref 8.1–13.5)
POTASSIUM SERPL-SCNC: 4.1 MMOL/L (ref 3.7–5.3)
PROSTATE SPECIFIC ANTIGEN: 1.79 NG/ML
PROT SERPL-MCNC: 7 G/DL (ref 6.4–8.3)
RBC # BLD: 4.65 M/UL (ref 4.21–5.77)
SODIUM SERPL-SCNC: 138 MMOL/L (ref 135–144)
TRIGL SERPL-MCNC: 89 MG/DL
WBC # BLD AUTO: 4 K/UL (ref 3.5–11.3)

## 2023-02-28 LAB
EST. AVERAGE GLUCOSE BLD GHB EST-MCNC: 100 MG/DL
HBA1C MFR BLD: 5.1 % (ref 4–6)

## 2023-03-01 DIAGNOSIS — E78.2 MIXED HYPERLIPIDEMIA: Primary | ICD-10-CM

## 2023-04-21 DIAGNOSIS — L40.9 PSORIASIS: ICD-10-CM

## 2023-04-21 RX ORDER — HALOBETASOL PROPIONATE 0.05 %
OINTMENT (GRAM) TOPICAL
Qty: 45 G | Refills: 0 | Status: SHIPPED | OUTPATIENT
Start: 2023-04-21

## 2023-04-21 NOTE — TELEPHONE ENCOUNTER
Last visit: 1/25/2023    Last Med refill: 12/15/2022  Does patient have enough medication for 72 hours: Yes    Next Visit Date:  No future appointments.     Health Maintenance   Topic Date Due    Colorectal Cancer Screen  01/27/2023    DTaP/Tdap/Td vaccine (1 - Tdap) 01/25/2024 (Originally 11/10/1979)    Shingles vaccine (1 of 2) 01/25/2024 (Originally 11/10/2011)    COVID-19 Vaccine (1) 01/25/2024 (Originally 5/10/1961)    Flu vaccine (Season Ended) 08/01/2023    Depression Screen  01/25/2024    Lipids  02/27/2028    Hepatitis C screen  Completed    Hepatitis A vaccine  Aged Out    Hib vaccine  Aged Out    Meningococcal (ACWY) vaccine  Aged Out    Pneumococcal 0-64 years Vaccine  Aged Out    Diabetes screen  Discontinued    HIV screen  Discontinued    Prostate Specific Antigen (PSA) Screening or Monitoring  Discontinued       Hemoglobin A1C (%)   Date Value   02/27/2023 5.1             ( goal A1C is < 7)   No results found for: LABMICR  LDL Cholesterol (mg/dL)   Date Value   02/27/2023 131 (H)   12/27/2017 117     LDL Calculated (mg/dL)   Date Value   05/23/2014 131   04/03/2012 152       (goal LDL is <100)   AST (U/L)   Date Value   02/27/2023 22     ALT (U/L)   Date Value   02/27/2023 20     BUN (mg/dL)   Date Value   02/27/2023 20     BP Readings from Last 3 Encounters:   01/04/21 135/78   07/29/20 (!) 149/94   12/13/19 (!) 153/84          (goal 120/80)    All Future Testing planned in [facility]  Lab Frequency Next Occurrence   Lipid, Fasting Once 03/01/2023               Patient Active Problem List:     HTN (hypertension)     Serrated adenoma of colon     Psoriasis

## 2023-07-06 DIAGNOSIS — I10 ESSENTIAL HYPERTENSION: ICD-10-CM

## 2023-07-06 RX ORDER — LISINOPRIL 10 MG/1
10 TABLET ORAL DAILY
Qty: 90 TABLET | Refills: 0 | Status: SHIPPED | OUTPATIENT
Start: 2023-07-06

## 2023-07-06 NOTE — TELEPHONE ENCOUNTER
LOV  1/5/23  RTO not scheduled  LRF 4/21/23          Controlled Substance Monitoring:    Acute and Chronic Pain Monitoring:   No flowsheet data found.

## 2023-08-23 ENCOUNTER — TELEPHONE (OUTPATIENT)
Dept: FAMILY MEDICINE CLINIC | Age: 63
End: 2023-08-23